# Patient Record
Sex: MALE | Race: WHITE | Employment: OTHER | URBAN - METROPOLITAN AREA
[De-identification: names, ages, dates, MRNs, and addresses within clinical notes are randomized per-mention and may not be internally consistent; named-entity substitution may affect disease eponyms.]

---

## 2021-03-08 ENCOUNTER — NURSE ONLY (OUTPATIENT)
Dept: PRIMARY CARE CLINIC | Age: 61
End: 2021-03-08
Payer: COMMERCIAL

## 2021-03-08 DIAGNOSIS — Z23 HIGH PRIORITY FOR COVID-19 VIRUS VACCINATION: Primary | ICD-10-CM

## 2021-03-08 PROCEDURE — 91301 COVID-19, MODERNA VACCINE 100MCG/0.5ML DOSE: CPT | Performed by: FAMILY MEDICINE

## 2021-03-08 PROCEDURE — 0011A COVID-19, MODERNA VACCINE 100MCG/0.5ML DOSE: CPT | Performed by: FAMILY MEDICINE

## 2021-03-15 ENCOUNTER — NURSE ONLY (OUTPATIENT)
Dept: PRIMARY CARE CLINIC | Age: 61
End: 2021-03-15

## 2021-03-15 DIAGNOSIS — Z23 HIGH PRIORITY FOR COVID-19 VIRUS VACCINATION: Primary | ICD-10-CM

## 2021-04-05 PROCEDURE — 0012A COVID-19, MODERNA VACCINE 100MCG/0.5ML DOSE: CPT | Performed by: NURSE PRACTITIONER

## 2021-04-05 PROCEDURE — 91301 COVID-19, MODERNA VACCINE 100MCG/0.5ML DOSE: CPT | Performed by: NURSE PRACTITIONER

## 2021-04-08 ENCOUNTER — NURSE ONLY (OUTPATIENT)
Dept: PRIMARY CARE CLINIC | Age: 61
End: 2021-04-08
Payer: COMMERCIAL

## 2021-04-08 DIAGNOSIS — Z23 HIGH PRIORITY FOR COVID-19 VIRUS VACCINATION: Primary | ICD-10-CM

## 2021-10-03 ENCOUNTER — HOSPITAL ENCOUNTER (EMERGENCY)
Age: 61
Discharge: HOME | End: 2021-10-03
Attending: EMERGENCY MEDICINE
Payer: COMMERCIAL

## 2021-10-03 ENCOUNTER — APPOINTMENT (OUTPATIENT)
Dept: GENERAL RADIOLOGY | Age: 61
End: 2021-10-03
Payer: COMMERCIAL

## 2021-10-03 VITALS
BODY MASS INDEX: 30.9 KG/M2 | OXYGEN SATURATION: 98 % | DIASTOLIC BLOOD PRESSURE: 83 MMHG | SYSTOLIC BLOOD PRESSURE: 129 MMHG | WEIGHT: 220.68 LBS | HEIGHT: 71 IN | HEART RATE: 73 BPM | TEMPERATURE: 98.4 F | RESPIRATION RATE: 14 BRPM

## 2021-10-03 DIAGNOSIS — S60.051A CONTUSION OF RIGHT LITTLE FINGER WITHOUT DAMAGE TO NAIL, INITIAL ENCOUNTER: ICD-10-CM

## 2021-10-03 DIAGNOSIS — S61.226A LACERATION OF RIGHT LITTLE FINGER WITH FOREIGN BODY WITHOUT DAMAGE TO NAIL, INITIAL ENCOUNTER: Primary | ICD-10-CM

## 2021-10-03 PROCEDURE — 73140 X-RAY EXAM OF FINGER(S): CPT

## 2021-10-03 PROCEDURE — 99284 EMERGENCY DEPT VISIT MOD MDM: CPT

## 2021-10-03 RX ORDER — SULFAMETHOXAZOLE AND TRIMETHOPRIM 800; 160 MG/1; MG/1
1 TABLET ORAL 2 TIMES DAILY
Qty: 20 TABLET | Refills: 0 | Status: SHIPPED | OUTPATIENT
Start: 2021-10-03 | End: 2021-10-13

## 2021-10-03 RX ORDER — CEPHALEXIN 500 MG/1
500 CAPSULE ORAL 4 TIMES DAILY
Qty: 40 CAPSULE | Refills: 0 | Status: SHIPPED | OUTPATIENT
Start: 2021-10-03 | End: 2021-10-13

## 2021-10-03 NOTE — ED PROVIDER NOTES
[id number] [facility]      CHIEF COMPLAINT  Hand Injury (5th digit right hand  lac over mid joint  redness and pain)       HISTORY OF PRESENT ILLNESS  Sky Perkins is a 59 y.o. male  who presents to the ED complaining of right fifth digit pain after injury yesterday afternoon around 1 PM.  Patient states that he was working on his vehicle and had a wrench and it slipped and he hit his hand quite hard at that point. He noticed a small laceration over the PIP joint of the right fifth digit on the dorsal aspect. He cleaned it put Neosporin on it and wrapped it. He did not think much of it but throughout the night whenever he moved his hand he had significant pain. He states that he moves quite a bit at night so this is quite an issue. He is right-hand dominant. His tetanus is up-to-date per the patient. Patient denies any other injury from the event. No hand pain. No loss of consciousness or head trauma. He denies any known fevers. Pain is worse with movement and he states that actually when he is sitting here it does not really hurt him. He did take over-the-counter Tylenol Motrin prior to arrival and declines any for any pain control at this time. No other complaints, modifying factors or associated symptoms. I have reviewed the following from the nursing documentation. Past Medical History:   Diagnosis Date    Hyperlipidemia     Hypertension      Past Surgical History:   Procedure Laterality Date    APPENDECTOMY       No family history on file.   Social History     Socioeconomic History    Marital status:      Spouse name: Not on file    Number of children: Not on file    Years of education: Not on file    Highest education level: Not on file   Occupational History    Not on file   Tobacco Use    Smoking status: Light Tobacco Smoker     Types: Cigars    Smokeless tobacco: Never Used   Substance and Sexual Activity    Alcohol use: Yes     Comment: socially     Drug use: Not on file    Sexual activity: Not on file   Other Topics Concern    Not on file   Social History Narrative    Not on file     Social Determinants of Health     Financial Resource Strain:     Difficulty of Paying Living Expenses:    Food Insecurity:     Worried About Running Out of Food in the Last Year:     [address] in the Last Year:    Transportation Needs:     Lack of Transportation (Medical):  Lack of Transportation (Non-Medical):    Physical Activity:     Days of Exercise per Week:     Minutes of Exercise per Session:    Stress:     Feeling of Stress :    Social Connections:     Frequency of Communication with Friends and Family:     Frequency of Social Gatherings with Friends and Family:     Attends Zoro[employer] Services:     Active Member of Clubs or Organizations:     Attends Club or Organization Meetings:     Marital Status:    Intimate Partner Violence:     Fear of Current or Ex-Partner:     Emotionally Abused:     Physically Abused:     Sexually Abused:      No current facility-administered medications for this encounter. Current Outpatient Medications   Medication Sig Dispense Refill    sulfamethoxazole-trimethoprim (BACTRIM DS;SEPTRA DS) 800-160 MG per tablet Take 1 tablet by mouth 2 times daily for 10 days 20 tablet 0    cephALEXin (KEFLEX) 500 MG capsule Take 1 capsule by mouth 4 times daily for 10 days 40 capsule 0    benazepril (LOTENSIN) 10 MG tablet Take 10 mg by mouth daily      pravastatin (PRAVACHOL) 40 MG tablet Take 40 mg by mouth daily       Allergies   Allergen Reactions    Morphine        REVIEW OF SYSTEMS  10 systems reviewed, pertinent positives per HPI otherwise noted to be negative. PHYSICAL EXAM  /83   Pulse 73   Temp 98.4 °F (36.9 °C) (Oral)   Resp 14   Ht 5' 11\" (1.803 m)   Wt 220 lb 10.9 oz (100.1 kg)   SpO2 98%   BMI 30.78 kg/m²    GENERAL APPEARANCE: Awake and alert. Cooperative. No acute distress.   HENT: Normocephalic. Atraumatic. Mucous membranes are moist.  No drooling or stridor. NECK: Supple. EYES: PERRL. EOM's grossly intact. HEART/CHEST: RRR. No murmurs. 2+ radial pulses bilaterally. LUNGS: Respirations unlabored. CTAB. Good air exchange. Speaking comfortably in full sentences. ABDOMEN: No tenderness. Soft. Non-distended. No masses. No organomegaly. No guarding or rebound. MUSCULOSKELETAL: No extremity edema. Compartments soft. No deformity. No tenderness in the extremities. All extremities neurovascularly intact. SKIN: Warm and dry. There is mild erythema surrounding a 1 cm laceration noted over the dorsal PIP joint of the right fifth digit without active bleeding. No dehiscence of the wound which is well approximated. NEUROLOGICAL: Alert and oriented. CN's 2-12 intact. No gross facial drooping. Strength 5/5, sensation intact. Sensory and motor intact in the right fifth digit distally. He is able to flex and extend the digit although with significant pain and discomfort. PSYCHIATRIC: Normal mood and affect. LABS  I have reviewed all labs for this visit. No results found for this visit on 10/03/21. RADIOLOGY  XR FINGER RIGHT (MIN 2 VIEWS)    Result Date: 10/3/2021  EXAMINATION: THREE XRAY VIEWS OF THE RIGHT FINGERS 10/3/2021 7:59 am COMPARISON: None. HISTORY: ORDERING SYSTEM PROVIDED HISTORY: 5th finger, blunt trauma with small lac occurred yesterday afternoon TECHNOLOGIST PROVIDED HISTORY: Reason for exam:->5th finger, blunt trauma with small lac occurred yesterday afternoon Reason for Exam: [address]. [address] AND HIT HIS 5TH DIGIT RT. FINGER AGAINST  SOMETHING IN CAR C/O RADIATING PAIN WITH SWELLING AND LAC ACROSS MID JOINT 5TH DIGIT Acuity: Acute Type of Exam: Initial Mechanism of Injury: WORKING ON CAR HIT FINGER AGAINST SOMETHING UNDER CAR CHOW Relevant Medical/Surgical History: NO HX OF RT. HAND PROBLEMS, NO HX OF SURGERY TO RT.  HAND FINDINGS: There are 2 faint small radiodensities overlying the soft tissues adjacent to the dorsal and ulnar aspect of the 5th PIP joint. There is no evidence of fracture, malalignment or bone destruction. No acute osseous abnormality. Small radiodensities adjacent to the 5th PIP joint may represent debris on the skin or soft tissue foreign bodies. ED COURSE/MDM  Patient seen and evaluated. Old records reviewed. Imaging reviewed and results discussed with patient. Patient presenting for evaluation of right fifth digit injury. No underlying fracture noted on x-ray. There is a laceration that is now closed and I do note small radiodense disease noted on x-ray and discussed the risk and benefit of trying to open that laceration back up to try to identify the foreign bodies versus allowing it to heal, placing patient on oral antibiotics, and having him follow-up closely to ensure improvement as expected. I felt that there was more likely to be additional damage incurred if I tried to reopen and cut open the wound to try to identify these small punctate foreign bodies. Patient was very much in agreement of plan for discharge home with orthopedic referral to utilize as needed otherwise to follow-up with his PCP. Antibiotics were sent to his pharmacy of choice. Strict return precautions discussed at length. All questions answered at time of discharge. I estimate there is LOW risk for COMPARTMENT SYNDROME, DEEP VENOUS THROMBOSIS, SEPTIC ARTHRITIS, TENDON OR NEUROVASCULAR INJURY, thus I consider the discharge disposition reasonable. Ivana King and I have discussed the diagnosis and risks, and we agree with discharging home to follow-up with their primary doctor or the referral orthopedist. We also discussed returning to the Emergency Department immediately if new or worsening symptoms occur.  We have discussed the symptoms which are most concerning (e.g., changing or worsening pain, numbness, weakness) that necessitate immediate return. During the patient's ED course, the patient was given:  Medications - No data to display     CLINICAL IMPRESSION  1. Laceration of right little finger with foreign body without damage to nail, initial encounter    2. Contusion of right little finger without damage to nail, initial encounter        Blood pressure 129/83, pulse 73, temperature 98.4 °F (36.9 °C), temperature source Oral, resp. rate 14, height 5' 11\" (1.803 m), weight 220 lb 10.9 oz (100.1 kg), SpO2 98 %. DISPOSITION  Preston Herron was discharged to home in stable condition. Patient was given scripts for the following medications. I counseled patient how to take these medications. New Prescriptions    CEPHALEXIN (KEFLEX) 500 MG CAPSULE    Take 1 capsule by mouth 4 times daily for 10 days    SULFAMETHOXAZOLE-TRIMETHOPRIM (BACTRIM DS;SEPTRA DS) 800-160 MG PER TABLET    Take 1 tablet by mouth 2 times daily for 10 days       Follow-up with:  Felisa Prabhakar MD  [facility]  [phone]    Schedule an appointment as soon as possible for a visit in 2 days  For recheck    Eder Hauser MD  [address] [place] [address]  [phone]    Schedule an appointment as soon as possible for a visit   As needed to follow up with orthopedics      DISCLAIMER: This chart was created using Dragon dictation software. Efforts were made by me to ensure accuracy, however some errors may be present due to limitations of this technology and occasionally words are not transcribed correctly.         Gertrudis García MD  10/03/21 [id number]

## 2021-10-03 NOTE — ED NOTES
Finger splint placed  Aware to elevate  To return increased redness  To follow up with Dr Daina Aase for recheck  Finger splint placed  Walked out with ease     Mari aDolores Cheng RN  10/03/21 [id number]